# Patient Record
Sex: FEMALE | Race: AMERICAN INDIAN OR ALASKA NATIVE
[De-identification: names, ages, dates, MRNs, and addresses within clinical notes are randomized per-mention and may not be internally consistent; named-entity substitution may affect disease eponyms.]

---

## 2021-01-21 ENCOUNTER — HOSPITAL ENCOUNTER (EMERGENCY)
Dept: HOSPITAL 5 - ED | Age: 21
Discharge: HOME | End: 2021-01-21
Payer: MEDICAID

## 2021-01-21 VITALS — SYSTOLIC BLOOD PRESSURE: 111 MMHG | DIASTOLIC BLOOD PRESSURE: 60 MMHG
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
## 2021-01-21 DIAGNOSIS — K64.9: ICD-10-CM

## 2021-01-21 DIAGNOSIS — Z79.899: ICD-10-CM

## 2021-01-21 DIAGNOSIS — S39.012A: Primary | ICD-10-CM
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
## 2021-01-21 LAB
BACTERIA #/AREA URNS HPF: (no result) /HPF
BASOPHILS # (AUTO): 0 K/MM3 (ref 0–0.1)
BASOPHILS NFR BLD AUTO: 0.4 % (ref 0–1.8)
BILIRUB UR QL STRIP: (no result)
BLOOD UR QL VISUAL: (no result)
BUN SERPL-MCNC: 13 MG/DL (ref 7–17)
BUN/CREAT SERPL: 19 %
CALCIUM SERPL-MCNC: 9.2 MG/DL (ref 8.4–10.2)
EOSINOPHIL # BLD AUTO: 0.2 K/MM3 (ref 0–0.4)
EOSINOPHIL NFR BLD AUTO: 3.9 % (ref 0–4.3)
HCT VFR BLD CALC: 42 % (ref 30.3–42.9)
HEMOLYSIS INDEX: 12
HGB BLD-MCNC: 13.9 GM/DL (ref 10.1–14.3)
LYMPHOCYTES # BLD AUTO: 1.9 K/MM3 (ref 1.2–5.4)
LYMPHOCYTES NFR BLD AUTO: 39.4 % (ref 13.4–35)
MCHC RBC AUTO-ENTMCNC: 33 % (ref 30–34)
MCV RBC AUTO: 89 FL (ref 79–97)
MONOCYTES # (AUTO): 0.4 K/MM3 (ref 0–0.8)
MONOCYTES % (AUTO): 7.4 % (ref 0–7.3)
MUCOUS THREADS #/AREA URNS HPF: (no result) /HPF
PH UR STRIP: 6 [PH] (ref 5–7)
PLATELET # BLD: 264 K/MM3 (ref 140–440)
PROT UR STRIP-MCNC: (no result) MG/DL
RBC # BLD AUTO: 4.75 M/MM3 (ref 3.65–5.03)
RBC #/AREA URNS HPF: 1 /HPF (ref 0–6)
UROBILINOGEN UR-MCNC: 4 MG/DL (ref ?–2)
WBC #/AREA URNS HPF: 2 /HPF (ref 0–6)

## 2021-01-21 PROCEDURE — 85025 COMPLETE CBC W/AUTO DIFF WBC: CPT

## 2021-01-21 PROCEDURE — 81025 URINE PREGNANCY TEST: CPT

## 2021-01-21 PROCEDURE — 81001 URINALYSIS AUTO W/SCOPE: CPT

## 2021-01-21 PROCEDURE — 36415 COLL VENOUS BLD VENIPUNCTURE: CPT

## 2021-01-21 PROCEDURE — 80048 BASIC METABOLIC PNL TOTAL CA: CPT

## 2021-01-21 NOTE — EMERGENCY DEPARTMENT REPORT
ED General Adult HPI





- General


Chief complaint: Urogenital-Female


Stated complaint: LOW BACK PAIN (POSS PREG)


PUI?: No


Time Seen by Provider: 01/21/21 20:52


Source: patient


Mode of arrival: Ambulatory


Limitations: No Limitations





- History of Present Illness


Initial comments: 





Ms. Oconnor is a 20-year-old female with no prior medical history who presents 

the ED complaining of mid lower back pain for the past couple of weeks.  Patient

denies any injury or trauma to the back.  Patient states that back pain is 

localized to the mid lower back.  Patient denies any radiation elsewhere.  

Patient also states that she thinks she may be pregnant she has not had a 

menstrual period in 2 years so she is unsure.  Patient states that she does have

a Nexplanon in her left arm that was placed 2 years ago.  Patient also states 

that she has a history of hemorrhoids that causes her discomfort from time to 

time.  She denies fever/chills/nausea vomiting/vaginal bleed/pelvic pain/dysuria

or any other symptoms





- Related Data


                                  Previous Rx's











 Medication  Instructions  Recorded  Last Taken  Type


 


Docusate Sodium [Colace] 100 mg PO BID PRN #30 capsule 01/21/21 Unknown Rx


 


Hydrocortisone [Anucort-HC SUPPOS] 25 mg RC BID #30 supp.rect 01/21/21 Unknown 

Rx











                                    Allergies











Allergy/AdvReac Type Severity Reaction Status Date / Time


 


No Known Allergies Allergy   Unverified 01/21/21 19:16














ED Review of Systems


ROS: 


Stated complaint: LOW BACK PAIN (POSS PREG)


Other details as noted in HPI





Comment: All other systems reviewed and negative





ED Past Medical Hx





- Past Medical History


Previous Medical History?: No


Additional medical history: hemorrhoids





- Surgical History


Past Surgical History?: No





- Medications


Home Medications: 


                                Home Medications











 Medication  Instructions  Recorded  Confirmed  Last Taken  Type


 


Docusate Sodium [Colace] 100 mg PO BID PRN #30 capsule 01/21/21  Unknown Rx


 


Hydrocortisone [Anucort-HC SUPPOS] 25 mg RC BID #30 supp.rect 01/21/21  Unknown 

Rx














ED Physical Exam





- General


Limitations: No Limitations


General appearance: alert, in no apparent distress





- Head


Head exam: Present: atraumatic, normocephalic





- Eye


Eye exam: Present: normal appearance





- ENT


ENT exam: Present: mucous membranes moist





- Neck


Neck exam: Present: normal inspection





- Respiratory


Respiratory exam: Present: normal lung sounds bilaterally.  Absent: respiratory 

distress





- Cardiovascular


Cardiovascular Exam: Present: regular rate, normal rhythm.  Absent: systolic 

murmur, diastolic murmur, rubs, gallop





- GI/Abdominal


GI/Abdominal exam: Present: soft, normal bowel sounds.  Absent: distended, 

tenderness





- Extremities Exam


Extremities exam: Present: normal inspection, full ROM.  Absent: tenderness





- Back Exam


Back exam: Present: normal inspection, full ROM.  Absent: tenderness, CVA te

nderness (R), CVA tenderness (L), muscle spasm, paraspinal tenderness





- Neurological Exam


Neurological exam: Present: alert, oriented X3, normal gait





- Psychiatric


Psychiatric exam: Present: normal affect, normal mood





- Skin


Skin exam: Present: warm, dry, intact, normal color.  Absent: rash





ED Course


                                   Vital Signs











  01/21/21 01/21/21





  19:17 23:42


 


Temperature 99.6 F 


 


Pulse Rate 68 72


 


Respiratory 16 18





Rate  


 


Blood Pressure 111/60 


 


O2 Sat by Pulse 98 97





Oximetry  














ED Medical Decision Making





- Lab Data


Result diagrams: 


                                 01/21/21 19:54





                                 01/21/21 19:54








                             Laboratory Last Values











WBC  4.9 K/mm3 (4.5-11.0)   01/21/21  19:54    


 


RBC  4.75 M/mm3 (3.65-5.03)   01/21/21  19:54    


 


Hgb  13.9 gm/dl (10.1-14.3)   01/21/21  19:54    


 


Hct  42.0 % (30.3-42.9)   01/21/21  19:54    


 


MCV  89 fl (79-97)   01/21/21  19:54    


 


MCH  29 pg (28-32)   01/21/21  19:54    


 


MCHC  33 % (30-34)   01/21/21  19:54    


 


RDW  13.9 % (13.2-15.2)   01/21/21  19:54    


 


Plt Count  264 K/mm3 (140-440)   01/21/21  19:54    


 


Lymph % (Auto)  39.4 % (13.4-35.0)  H  01/21/21  19:54    


 


Mono % (Auto)  7.4 % (0.0-7.3)  H  01/21/21  19:54    


 


Eos % (Auto)  3.9 % (0.0-4.3)   01/21/21  19:54    


 


Baso % (Auto)  0.4 % (0.0-1.8)   01/21/21  19:54    


 


Lymph # (Auto)  1.9 K/mm3 (1.2-5.4)   01/21/21  19:54    


 


Mono # (Auto)  0.4 K/mm3 (0.0-0.8)   01/21/21  19:54    


 


Eos # (Auto)  0.2 K/mm3 (0.0-0.4)   01/21/21  19:54    


 


Baso # (Auto)  0.0 K/mm3 (0.0-0.1)   01/21/21  19:54    


 


Seg Neutrophils %  48.9 % (40.0-70.0)   01/21/21  19:54    


 


Seg Neutrophils #  2.4 K/mm3 (1.8-7.7)   01/21/21  19:54    


 


Sodium  138 mmol/L (137-145)   01/21/21  19:54    


 


Potassium  4.1 mmol/L (3.6-5.0)   01/21/21  19:54    


 


Chloride  102.4 mmol/L ()   01/21/21  19:54    


 


Carbon Dioxide  29 mmol/L (22-30)   01/21/21  19:54    


 


Anion Gap  11 mmol/L  01/21/21  19:54    


 


BUN  13 mg/dL (7-17)   01/21/21  19:54    


 


Creatinine  0.7 mg/dL (0.6-1.2)   01/21/21  19:54    


 


Estimated GFR  > 60 ml/min  01/21/21  19:54    


 


BUN/Creatinine Ratio  19 %  01/21/21  19:54    


 


Glucose  94 mg/dL ()   01/21/21  19:54    


 


Calcium  9.2 mg/dL (8.4-10.2)   01/21/21  19:54    


 


Urine Color  Yellow  (Yellow)   01/21/21  Unknown


 


Urine Turbidity  Slightly-cloudy  (Clear)   01/21/21  Unknown


 


Urine pH  6.0  (5.0-7.0)   01/21/21  Unknown


 


Ur Specific Gravity  1.027  (1.003-1.030)   01/21/21  Unknown


 


Urine Protein  <15 mg/dl mg/dL (Negative)   01/21/21  Unknown


 


Urine Glucose (UA)  Neg mg/dL (Negative)   01/21/21  Unknown


 


Urine Ketones  Neg mg/dL (Negative)   01/21/21  Unknown


 


Urine Blood  Neg  (Negative)   01/21/21  Unknown


 


Urine Nitrite  Neg  (Negative)   01/21/21  Unknown


 


Urine Bilirubin  Neg  (Negative)   01/21/21  Unknown


 


Urine Urobilinogen  4.0 mg/dL (<2.0)   01/21/21  Unknown


 


Ur Leukocyte Esterase  Neg  (Negative)   01/21/21  Unknown


 


Urine WBC (Auto)  2.0 /HPF (0.0-6.0)   01/21/21  Unknown


 


Urine RBC (Auto)  1.0 /HPF (0.0-6.0)   01/21/21  Unknown


 


U Epithel Cells (Auto)  1.0 /HPF (0-13.0)   01/21/21  Unknown


 


Urine Bacteria (Auto)  2+ /HPF (Negative)   01/21/21  Unknown


 


Urine Mucus  3+ /HPF  01/21/21  Unknown


 


Urine HCG, Qual  Negative  (Negative)   01/21/21  Unknown














- Medical Decision Making


20-year-old female presents with hemorrhoids/low back muscle strain


All labs are within normal limits no abnormalities seen.  Urinalysis negative 

urine pregnancy test negative


I discussed all findings with the patient.  I discussed with patient need to 

follow-up with her primary care physician.


Patient understand instructions and states she will follow-up.  Patient had no 

acute or respiratory distress throughout ED stay.


Discussed with patient follow-up with gastroenterology for management of 

worsening hemorrhoids.





Critical care attestation.: 


If time is entered above; I have spent that time in minutes in the direct care 

of this critically ill patient, excluding procedure time.








ED Disposition


Clinical Impression: 


 Hemorrhoids, Strain of muscle, fascia and tendon of lower back, initial 

encounter





Disposition: DC-01 TO HOME OR SELFCARE


Is pt being admited?: No


Does the pt Need Aspirin: No


Condition: Stable


Instructions:  Surgical Procedures for Hemorrhoids, Nonsurgical Procedures for 

Hemorrhoids, Care After, Lumbar Strain


Additional Instructions: 


Make sure to follow up with the primary care physician as discussed.


Take all your medications as you've been prescribed.


If you have any worsening symptoms or develop new symptoms please return to ED 

immediately.


Prescriptions: 


Hydrocortisone [Anucort-HC SUPPOS] 25 mg RC BID #30 supp.rect


Docusate Sodium [Colace] 100 mg PO BID PRN #30 capsule


 PRN Reason: Constipation


Referrals: 


[facility] [Provider Group] - 3-5 Days


[facility] [Outside] - 3-5 Days


[facility] [Outside] - 3-5 Days


Forms:  Accompanied Note, Work/School Release Form(ED)


Time of Disposition: 21:53

## 2021-06-28 ENCOUNTER — HOSPITAL ENCOUNTER (EMERGENCY)
Dept: HOSPITAL 5 - ED | Age: 21
Discharge: HOME | End: 2021-06-28
Payer: MEDICAID

## 2021-06-28 VITALS — SYSTOLIC BLOOD PRESSURE: 120 MMHG | DIASTOLIC BLOOD PRESSURE: 74 MMHG

## 2021-06-28 DIAGNOSIS — Z79.899: ICD-10-CM

## 2021-06-28 DIAGNOSIS — N39.0: Primary | ICD-10-CM

## 2021-06-28 LAB
BACTERIA #/AREA URNS HPF: (no result) /HPF
BILIRUB UR QL STRIP: (no result)
BLOOD UR QL VISUAL: (no result)
MUCOUS THREADS #/AREA URNS HPF: (no result) /HPF
PH UR STRIP: 5 [PH] (ref 5–7)
RBC #/AREA URNS HPF: 61 /HPF (ref 0–6)
UROBILINOGEN UR-MCNC: < 2 MG/DL (ref ?–2)
WBC #/AREA URNS HPF: > 182 /HPF (ref 0–6)

## 2021-06-28 PROCEDURE — 81025 URINE PREGNANCY TEST: CPT

## 2021-06-28 PROCEDURE — 81001 URINALYSIS AUTO W/SCOPE: CPT

## 2021-06-28 NOTE — EMERGENCY DEPARTMENT REPORT
ED Female  HPI





- General


Chief complaint: Urogenital-Female


Stated complaint: KIDNEY PAINS BACK PAINS


Time Seen by Provider: 06/28/21 10:33


Source: patient


Mode of arrival: Ambulatory


Limitations: No Limitations





- History of Present Illness


Initial comments: 





Patient is a 20-year-old female presents emergency room with complaints of a 

possible UTI.  She states that she has had symptoms for a week and reports she 

has been drinking cranberry juice as she thought that would help her symptoms.  

She states that she has lower back discomfort, lower abdominal discomfort, 

urinary frequency, urinary urgency, pressure at the end of urination.  She 

denies any fever, nausea, vomiting, diarrhea, abnormal vaginal discharge.  She 

denies any concerns for STDs.  No past medical history.  No allergies to 

medications.  She states that she has an Implanon for birth control





- Related Data


                                  Previous Rx's











 Medication  Instructions  Recorded  Last Taken  Type


 


Docusate Sodium [Colace] 100 mg PO BID PRN #30 capsule 01/21/21 Unknown Rx


 


Hydrocortisone [Anucort-HC SUPPOS] 25 mg RC BID #30 supp.rect 01/21/21 Unknown 

Rx


 


cephALEXin [Keflex] 500 mg PO BID 7 Days #14 cap 06/28/21 Unknown Rx











                                    Allergies











Allergy/AdvReac Type Severity Reaction Status Date / Time


 


No Known Allergies Allergy   Verified 06/28/21 10:04














ED Review of Systems


ROS: 


Stated complaint: KIDNEY PAINS BACK PAINS


Other details as noted in HPI





Comment: All other systems reviewed and negative





ED Past Medical Hx





- Past Medical History


Additional medical history: hemorrhoids





- Social History


Smoking Status: Never Smoker


Substance Use Type: None





- Medications


Home Medications: 


                                Home Medications











 Medication  Instructions  Recorded  Confirmed  Last Taken  Type


 


Docusate Sodium [Colace] 100 mg PO BID PRN #30 capsule 01/21/21  Unknown Rx


 


Hydrocortisone [Anucort-HC SUPPOS] 25 mg RC BID #30 supp.rect 01/21/21  Unknown 

Rx


 


cephALEXin [Keflex] 500 mg PO BID 7 Days #14 cap 06/28/21  Unknown Rx














ED Physical Exam





- General


Limitations: No Limitations


General appearance: alert, in no apparent distress





- Head


Head exam: Present: atraumatic, normocephalic





- Eye


Eye exam: Present: normal appearance





- ENT


ENT exam: Present: mucous membranes moist





- Respiratory


Respiratory exam: Present: normal lung sounds bilaterally.  Absent: respiratory 

distress, wheezes, rales, rhonchi, stridor, chest wall tenderness, accessory 

muscle use, decreased breath sounds, prolonged expiratory





- Cardiovascular


Cardiovascular Exam: Present: regular rate, normal rhythm, normal heart sounds. 

 Absent: systolic murmur, diastolic murmur, rubs, gallop





- GI/Abdominal


GI/Abdominal exam: Present: soft, normal bowel sounds.  Absent: distended, 

tenderness, guarding, rebound, rigid





- Back Exam


Back exam: Absent: CVA tenderness (R), CVA tenderness (L)





- Neurological Exam


Neurological exam: Present: alert, oriented X3





- Psychiatric


Psychiatric exam: Present: normal affect, normal mood





- Skin


Skin exam: Present: warm, dry, intact





ED Course


                                   Vital Signs











  06/28/21 06/28/21





  09:57 11:52


 


Temperature 99.5 F 


 


Pulse Rate 85 78


 


Respiratory 16 20





Rate  


 


Blood Pressure 107/50 


 


Blood Pressure  120/74





[Left]  


 


O2 Sat by Pulse 100 100





Oximetry  














ED Medical Decision Making





- Lab Data








                                   Lab Results











  06/28/21 Range/Units





  Unknown 


 


Urine Color  Yellow  (Yellow)  


 


Urine Turbidity  Turbid  (Clear)  


 


Urine pH  5.0  (5.0-7.0)  


 


Ur Specific Gravity  1.017  (1.003-1.030)  


 


Urine Protein  100 mg/dl  (Negative)  mg/dL


 


Urine Glucose (UA)  Neg  (Negative)  mg/dL


 


Urine Ketones  Neg  (Negative)  mg/dL


 


Urine Blood  Mod  (Negative)  


 


Urine Nitrite  Neg  (Negative)  


 


Urine Bilirubin  Neg  (Negative)  


 


Urine Urobilinogen  < 2.0  (<2.0)  mg/dL


 


Ur Leukocyte Esterase  Lg  (Negative)  


 


Urine WBC (Auto)  > 182.0 H  (0.0-6.0)  /HPF


 


Urine RBC (Auto)  61.0  (0.0-6.0)  /HPF


 


U Epithel Cells (Auto)  2.0  (0-13.0)  /HPF


 


Urine Bacteria (Auto)  2+  (Negative)  /HPF


 


Urine Mucus  1+  /HPF


 


Urine HCG, Qual  Negative  (Negative)  














- Medical Decision Making





Patient is a 20-year-old female presents emergency room with complaints of a 

possible UTI.  She states that she has had symptoms for a week and reports she 

has been drinking cranberry juice as she thought that would help her symptoms.  

She states that she has lower back discomfort, lower abdominal discomfort, 

urinary frequency, urinary urgency, pressure at the end of urination.  She 

denies any fever, nausea, vomiting, diarrhea, abnormal vaginal discharge.  She 

denies any concerns for STDs.  No past medical history.  No allergies to 

medications.  She states that she has an Implanon for birth control.  Vitals are

 normal.  No abdominal tenderness or CVA tenderness on exam.  UA shows evidence 

of UTI.  Patient given prescription for Keflex.  Advised patient Please take 

medication as prescribed to completion.  Increase your fluid intake.  Follow-up 

with a primary care doctor and have your urine retested for clearance of 

bacteria.  Return to emergency room for any new or symptoms.


Critical care attestation.: 


If time is entered above; I have spent that time in minutes in the direct care 

of this critically ill patient, excluding procedure time.








ED Disposition


Clinical Impression: 


UTI (urinary tract infection)


Qualifiers:


 Urinary tract infection type: acute cystitis Hematuria presence: with hematuria

 Qualified Code(s): N30.01 - Acute cystitis with hematuria





Disposition: DC-01 TO HOME OR SELFCARE


Is pt being admited?: No


Does the pt Need Aspirin: No


Condition: Stable


Instructions:  Urinary Tract Infection, Adult, Easy-to-Read


Additional Instructions: 


Please take medication as prescribed to completion.  Increase your fluid intake.

  Follow-up with a primary care doctor and have your urine retested for 

clearance of bacteria.  Return to emergency room for any new or symptoms.


Prescriptions: 


cephALEXin [Keflex] 500 mg PO BID 7 Days #14 cap


Referrals: 


[facility] [Provider Group] - 3-5 Days


JESSE SPENCE MD [Staff Physician] - 3-5 Days


PRIMARY CARE,MD [Primary Care Provider] - 3-5 Days


Time of Disposition: 11:40


Print Language: ENGLISH

## 2021-11-25 ENCOUNTER — HOSPITAL ENCOUNTER (EMERGENCY)
Dept: HOSPITAL 5 - ED | Age: 21
Discharge: HOME | End: 2021-11-25
Payer: MEDICAID

## 2021-11-25 VITALS — DIASTOLIC BLOOD PRESSURE: 84 MMHG | SYSTOLIC BLOOD PRESSURE: 126 MMHG

## 2021-11-25 DIAGNOSIS — N94.6: Primary | ICD-10-CM

## 2021-11-25 DIAGNOSIS — R79.1: ICD-10-CM

## 2021-11-25 LAB
ALBUMIN SERPL-MCNC: 4.3 G/DL (ref 3.9–5)
ALT SERPL-CCNC: 12 UNITS/L (ref 7–56)
APTT BLD: 30.5 SEC. (ref 24.2–36.6)
BASOPHILS # (AUTO): 0 K/MM3 (ref 0–0.1)
BILIRUB UR QL STRIP: (no result)
BLOOD UR QL VISUAL: (no result)
BUN SERPL-MCNC: 10 MG/DL (ref 7–17)
BUN/CREAT SERPL: 17 %
CALCIUM SERPL-MCNC: 9 MG/DL (ref 8.4–10.2)
EOSINOPHIL # BLD AUTO: 0.1 K/MM3 (ref 0–0.4)
EOSINOPHIL NFR BLD AUTO: 3 % (ref 0–4.3)
HCT VFR BLD CALC: 42.8 % (ref 30.3–42.9)
HEMOLYSIS INDEX: 4
HGB BLD-MCNC: 14.2 GM/DL (ref 10.1–14.3)
INR PPP: 0.96 (ref 0.87–1.13)
LYMPHOCYTES # BLD AUTO: 1.2 K/MM3 (ref 1.2–5.4)
LYMPHOCYTES NFR BLD AUTO: 26.5 % (ref 13.4–35)
MCHC RBC AUTO-ENTMCNC: 33 % (ref 30–34)
MCV RBC AUTO: 89 FL (ref 79–97)
MONOCYTES # (AUTO): 0.3 K/MM3 (ref 0–0.8)
MONOCYTES % (AUTO): 7.2 % (ref 0–7.3)
MUCOUS THREADS #/AREA URNS HPF: (no result) /HPF
PH UR STRIP: 6 [PH] (ref 5–7)
PLATELET # BLD: 273 K/MM3 (ref 140–440)
PROT UR STRIP-MCNC: (no result) MG/DL
RBC # BLD AUTO: 4.83 M/MM3 (ref 3.65–5.03)
RBC #/AREA URNS HPF: 2 /HPF (ref 0–6)
UROBILINOGEN UR-MCNC: 2 MG/DL (ref ?–2)
WBC #/AREA URNS HPF: 4 /HPF (ref 0–6)

## 2021-11-25 PROCEDURE — 93975 VASCULAR STUDY: CPT

## 2021-11-25 PROCEDURE — 76830 TRANSVAGINAL US NON-OB: CPT

## 2021-11-25 PROCEDURE — 85610 PROTHROMBIN TIME: CPT

## 2021-11-25 PROCEDURE — 99284 EMERGENCY DEPT VISIT MOD MDM: CPT

## 2021-11-25 PROCEDURE — 36415 COLL VENOUS BLD VENIPUNCTURE: CPT

## 2021-11-25 PROCEDURE — 85730 THROMBOPLASTIN TIME PARTIAL: CPT

## 2021-11-25 PROCEDURE — 80053 COMPREHEN METABOLIC PANEL: CPT

## 2021-11-25 PROCEDURE — 84703 CHORIONIC GONADOTROPIN ASSAY: CPT

## 2021-11-25 PROCEDURE — 85025 COMPLETE CBC W/AUTO DIFF WBC: CPT

## 2021-11-25 PROCEDURE — 81001 URINALYSIS AUTO W/SCOPE: CPT

## 2021-11-25 NOTE — ULTRASOUND REPORT
Pelvic ultrasound with Doppler



INDICATION: Pelvic pain with bleeding of the vagina



TECHNIQUE: Real-time grayscale and Doppler imaging of the pelvis performed.



FINDINGS: The uterus measures 8 x 3 x 4 cm. Endometrial thickness is about 2 mm. Both ovaries measure
 about 2.5 cm in diameter and contain multiple follicles. There is normal Doppler flow involving both
 ovaries. Minimal free pelvic fluid



IMPRESSION: Multiple small follicles within both ovaries. Normal Doppler flow. Minimal free pelvic fl
uid, probably physiologic.



Signer Name: Aurelio Napoles MD 

Signed: 11/25/2021 10:09 AM

Workstation Name: [id number]

## 2021-11-25 NOTE — ULTRASOUND REPORT
Pelvic ultrasound with Doppler



INDICATION: Pelvic pain with bleeding of the vagina



TECHNIQUE: Real-time grayscale and Doppler imaging of the pelvis performed.



FINDINGS: The uterus measures 8 x 3 x 4 cm. Endometrial thickness is about 2 mm. Both ovaries measure
 about 2.5 cm in diameter and contain multiple follicles. There is normal Doppler flow involving both
 ovaries. Minimal free pelvic fluid



IMPRESSION: Multiple small follicles within both ovaries. Normal Doppler flow. Minimal free pelvic fl
uid, probably physiologic.



Signer Name: Aurelio Napoles MD 

Signed: 11/25/2021 10:09 AM

Workstation Name: [id number]

## 2021-11-25 NOTE — EMERGENCY DEPARTMENT REPORT
ED Female  HPI





- General


Chief complaint: Vaginal Bleeding


Stated complaint: VAGINAL BLEEDING


Time Seen by Provider: 21 08:31


Source: patient


Mode of arrival: Ambulatory


Limitations: No Limitations





- History of Present Illness


Initial comments: 





This is a 21-year-old female nontoxic, well nourished in appearance, no acute 

signs of distress presents to the ED with c/o of vaginal bleeding and pelvic 

cramping x 2 weeks. Denies any upper abdomen pains.  Patient denies any vaginal 

discharge or foul odor. Patient denies any nausea, vomiting, chest pain, 

shortness of breathe, fever, chills, headache, stiff neck, numbness, tingling. 

Patient denies any urinary symptoms. Patient denies any allergies or PMH.


MD Complaint: vaginal bleeding, pelvic pain


-: days(s)


Radiation: non-radiating


Severity: mild


Severity scale (0 -10): 3


Quality: cramping


Consistency: intermittent


Improves with: none


Worsens with: none


Associated Symptoms: vaginal bleeding.  denies: vaginal discharge, abdominal 

pain, nausea/vomiting, fever/chills, headaches, loss of appetite, dysuria, 

hematuria, rash, seizure, shortness of breath, syncope, weakness





- Related Data


                                  Previous Rx's











 Medication  Instructions  Recorded  Last Taken  Type


 


Docusate Sodium [Colace] 100 mg PO BID PRN #30 capsule 21 Unknown Rx


 


Hydrocortisone [Anucort-HC SUPPOS] 25 mg RC BID #30 supp.rect 21 Unknown 

Rx


 


cephALEXin [Keflex] 500 mg PO BID 7 Days #14 cap 21 Unknown Rx


 


Naproxen 500 mg PO Q12H PRN #12 tablet 21 Unknown Rx











                                    Allergies











Allergy/AdvReac Type Severity Reaction Status Date / Time


 


No Known Allergies Allergy   Verified 21 08:30














ED Review of Systems


ROS: 


Stated complaint: VAGINAL BLEEDING


Other details as noted in HPI





Comment: All other systems reviewed and negative


Constitutional: denies: chills, fever


Eyes: denies: eye pain, eye discharge, vision change


ENT: denies: ear pain, throat pain


Respiratory: denies: cough, shortness of breath, wheezing


Cardiovascular: denies: chest pain, palpitations


Endocrine: no symptoms reported


Gastrointestinal: denies: abdominal pain, nausea, diarrhea


Genitourinary: abnormal menses.  denies: urgency, dysuria, frequency, hematuria,

 discharge, dyspareunia


Musculoskeletal: denies: back pain, joint swelling, arthralgia


Skin: denies: rash, lesions


Neurological: denies: headache, weakness, paresthesias


Psychiatric: denies: anxiety, depression


Hematological/Lymphatic: denies: easy bleeding, easy bruising





ED Past Medical Hx





- Past Medical History


Additional medical history: hemorrhoids





- Social History


Smoking Status: Never Smoker


Substance Use Type: None





- Medications


Home Medications: 


                                Home Medications











 Medication  Instructions  Recorded  Confirmed  Last Taken  Type


 


Docusate Sodium [Colace] 100 mg PO BID PRN #30 capsule 21 Unknown

 Rx


 


Hydrocortisone [Anucort-HC SUPPOS] 25 mg RC BID #30 supp.rect 21 

Unknown Rx


 


cephALEXin [Keflex] 500 mg PO BID 7 Days #14 cap 21 Unknown Rx


 


Naproxen 500 mg PO Q12H PRN #12 tablet 21  Unknown Rx














ED Physical Exam





- General


Limitations: No Limitations


General appearance: alert, in no apparent distress





- Head


Head exam: Present: atraumatic, normocephalic





- Eye


Eye exam: Present: normal appearance





- Neck


Neck exam: Present: normal inspection, full ROM.  Absent: lymphadenopathy





- Respiratory


Respiratory exam: Absent: respiratory distress





- Cardiovascular


Cardiovascular Exam: Present: regular rate





- GI/Abdominal


GI/Abdominal exam: Present: soft, normal bowel sounds.  Absent: distended, te

nderness, guarding, rebound, rigid, diminished bowel sounds





- Extremities Exam


Extremities exam: Present: full ROM





- Back Exam


Back exam: Present: normal inspection, full ROM.  Absent: tenderness, CVA 

tenderness (R), CVA tenderness (L), muscle spasm, paraspinal tenderness, 

vertebral tenderness, rash noted





- Neurological Exam


Neurological exam: Present: alert, oriented X3, normal gait





- Psychiatric


Psychiatric exam: Present: normal affect, normal mood





- Skin


Skin exam: Present: warm, dry, intact, normal color.  Absent: rash





ED Course


                                   Vital Signs











  21





  08:41 08:45


 


Temperature 98.2 F 


 


Pulse Rate 76 


 


Respiratory 18 





Rate  


 


Blood Pressure 126/84 





[Left]  


 


O2 Sat by Pulse 100 100





Oximetry  














- Reevaluation(s)


Reevaluation #1: 





21 09:26


Patient is speaking in full sentences with no signs of distress noted.





ED Medical Decision Making





- Lab Data


Result diagrams: 


                                 21 09:44





                                 21 09:44








                                   Lab Results











  21 Range/Units





  09:37 09:44 09:44 


 


WBC   4.6   (4.5-11.0)  K/mm3


 


RBC   4.83   (3.65-5.03)  M/mm3


 


Hgb   14.2   (10.1-14.3)  gm/dl


 


Hct   42.8   (30.3-42.9)  %


 


MCV   89   (79-97)  fl


 


MCH   29   (28-32)  pg


 


MCHC   33   (30-34)  %


 


RDW   13.6   (13.2-15.2)  %


 


Plt Count   273   (140-440)  K/mm3


 


Lymph % (Auto)   26.5   (13.4-35.0)  %


 


Mono % (Auto)   7.2   (0.0-7.3)  %


 


Eos % (Auto)   3.0   (0.0-4.3)  %


 


Baso % (Auto)   Np   


 


Lymph # (Auto)   1.2   (1.2-5.4)  K/mm3


 


Mono # (Auto)   0.3   (0.0-0.8)  K/mm3


 


Eos # (Auto)   0.1   (0.0-0.4)  K/mm3


 


Baso # (Auto)   0.0   (0.0-0.1)  K/mm3


 


Seg Neutrophils %   62.9   (40.0-70.0)  %


 


Seg Neutrophils #   2.9   (1.8-7.7)  K/mm3


 


PT     (12.2-14.9)  Sec.


 


INR     (0.87-1.13)  


 


APTT     (24.2-36.6)  Sec.


 


Sodium    136 L  (137-145)  mmol/L


 


Potassium    3.6  (3.6-5.0)  mmol/L


 


Chloride    99.6  ()  mmol/L


 


Carbon Dioxide    24  (22-30)  mmol/L


 


Anion Gap    16  mmol/L


 


BUN    10  (7-17)  mg/dL


 


Creatinine    0.6  (0.6-1.2)  mg/dL


 


Estimated GFR    > 60  ml/min


 


BUN/Creatinine Ratio    17  %


 


Glucose    95  ()  mg/dL


 


Calcium    9.0  (8.4-10.2)  mg/dL


 


Total Bilirubin    0.50  (0.1-1.2)  mg/dL


 


AST    17  (5-40)  units/L


 


ALT    12  (7-56)  units/L


 


Alkaline Phosphatase    57  ()  units/L


 


Total Protein    8.6 H  (6.3-8.2)  g/dL


 


Albumin    4.3  (3.9-5)  g/dL


 


Albumin/Globulin Ratio    1.0  %


 


HCG, Qual  Negative    (Negative)  


 


Urine Color     (Yellow)  


 


Urine Turbidity     (Clear)  


 


Urine pH     (5.0-7.0)  


 


Ur Specific Gravity     (1.003-1.030)  


 


Urine Protein     (Negative)  mg/dL


 


Urine Glucose (UA)     (Negative)  mg/dL


 


Urine Ketones     (Negative)  mg/dL


 


Urine Blood     (Negative)  


 


Urine Nitrite     (Negative)  


 


Urine Bilirubin     (Negative)  


 


Urine Urobilinogen     (<2.0)  mg/dL


 


Ur Leukocyte Esterase     (Negative)  


 


Urine WBC (Auto)     (0.0-6.0)  /HPF


 


Urine RBC (Auto)     (0.0-6.0)  /HPF


 


U Epithel Cells (Auto)     (0-13.0)  /HPF


 


Urine Mucus     /HPF














  21 Range/Units





  09:44 Unknown 


 


WBC    (4.5-11.0)  K/mm3


 


RBC    (3.65-5.03)  M/mm3


 


Hgb    (10.1-14.3)  gm/dl


 


Hct    (30.3-42.9)  %


 


MCV    (79-97)  fl


 


MCH    (28-32)  pg


 


MCHC    (30-34)  %


 


RDW    (13.2-15.2)  %


 


Plt Count    (140-440)  K/mm3


 


Lymph % (Auto)    (13.4-35.0)  %


 


Mono % (Auto)    (0.0-7.3)  %


 


Eos % (Auto)    (0.0-4.3)  %


 


Baso % (Auto)    


 


Lymph # (Auto)    (1.2-5.4)  K/mm3


 


Mono # (Auto)    (0.0-0.8)  K/mm3


 


Eos # (Auto)    (0.0-0.4)  K/mm3


 


Baso # (Auto)    (0.0-0.1)  K/mm3


 


Seg Neutrophils %    (40.0-70.0)  %


 


Seg Neutrophils #    (1.8-7.7)  K/mm3


 


PT  13.9   (12.2-14.9)  Sec.


 


INR  0.96   (0.87-1.13)  


 


APTT  30.5   (24.2-36.6)  Sec.


 


Sodium    (137-145)  mmol/L


 


Potassium    (3.6-5.0)  mmol/L


 


Chloride    ()  mmol/L


 


Carbon Dioxide    (22-30)  mmol/L


 


Anion Gap    mmol/L


 


BUN    (7-17)  mg/dL


 


Creatinine    (0.6-1.2)  mg/dL


 


Estimated GFR    ml/min


 


BUN/Creatinine Ratio    %


 


Glucose    ()  mg/dL


 


Calcium    (8.4-10.2)  mg/dL


 


Total Bilirubin    (0.1-1.2)  mg/dL


 


AST    (5-40)  units/L


 


ALT    (7-56)  units/L


 


Alkaline Phosphatase    ()  units/L


 


Total Protein    (6.3-8.2)  g/dL


 


Albumin    (3.9-5)  g/dL


 


Albumin/Globulin Ratio    %


 


HCG, Qual    (Negative)  


 


Urine Color   Yellow  (Yellow)  


 


Urine Turbidity   Clear  (Clear)  


 


Urine pH   6.0  (5.0-7.0)  


 


Ur Specific Gravity   1.020  (1.003-1.030)  


 


Urine Protein   <15 mg/dl  (Negative)  mg/dL


 


Urine Glucose (UA)   Neg  (Negative)  mg/dL


 


Urine Ketones   Neg  (Negative)  mg/dL


 


Urine Blood   Mod  (Negative)  


 


Urine Nitrite   Neg  (Negative)  


 


Urine Bilirubin   Neg  (Negative)  


 


Urine Urobilinogen   2.0  (<2.0)  mg/dL


 


Ur Leukocyte Esterase   Neg  (Negative)  


 


Urine WBC (Auto)   4.0  (0.0-6.0)  /HPF


 


Urine RBC (Auto)   2.0  (0.0-6.0)  /HPF


 


U Epithel Cells (Auto)   5.0  (0-13.0)  /HPF


 


Urine Mucus   1+  /HPF














- Radiology Data





[facility] [address] [place] [address] 

Ultrasound Report Signed Patient: ESTHER LIM MR#: [id number] : 

2000 Acct:[id number] Age/Sex: 21 / F ADM Date: 21 Loc: ED 

Attending Dr: Ordering Physician: PRECIOUS ROLAND NP Date of Service: 21 

Procedure(s): US pelvis duplex doppler comp Accession Number(s): [id number] cc: 

PRECIOUS ROLAND NP Pelvic ultrasound with Doppler INDICATION: Pelvic pain with 

bleeding of the vagina TECHNIQUE: Real-time grayscale and Doppler imaging of the

 pelvis performed. FINDINGS: The uterus measures 8 x 3 x 4 cm. Endometrial 

thickness is about 2 mm. Both ovaries measure about 2.5 cm in diameter and 

contain multiple follicles. There is normal Doppler flow involving both ovaries.

 Minimal free pelvic fluid IMPRESSION: Multiple small follicles within both 

ovaries. Normal Doppler flow. Minimal free pelvic fluid, probably physiologic. 

Signer Name: Aurelio Napoles MD Signed: 2021 10:09 AM Workstation Name: 

[id number] Transcribed By: BC Dictated By: Aurelio Napoles MD Electronically 

Authenticated By: Aurelio Napoles MD Signed Date/Time: 21 1009 DD/DT: 

21 1008 TD/TT:





[address] [address] 

Ultrasound Report Signed Patient: ESTHER LIM MR#: [id number] : 

2000 Acct:[id number] Age/Sex: 21 / F ADM Date: 21 Loc: ED At

[facility] Dr: Ordering Physician: PRECIOUS ROLAND NP Date of Service: 21 

Procedure(s): US transvaginal Accession Number(s): [id number] cc: PRECIOUS ROLAND NP

 Pelvic ultrasound with Doppler INDICATION: Pelvic pain with bleeding of the 

vagina TECHNIQUE: Real-time grayscale and Doppler imaging of the pelvis 

performed. FINDINGS: The uterus measures 8 x 3 x 4 cm. Endometrial thickness is 

about 2 mm. Both ovaries measure about 2.5 cm in diameter and contain multiple 

follicles. There is normal Doppler flow involving both ovaries. Minimal free 

pelvic fluid IMPRESSION: Multiple small follicles within both ovaries. Normal 

Doppler flow. Minimal free pelvic fluid, probably physiologic. Signer Name: 

Aurelio Napoles MD Signed: 2021 10:09 AM Workstation Name: [id number] 

Transcribed By: BC Dictated By: Aurelio Napoles MD Electronically Authenticated

 By: Aurelio Napoles MD Signed Date/Time: 21 1009 DD/DT: 21 1008 

TD/TT:





- Medical Decision Making





This is a 21-year-old female presents with dysmenorrhea.  Patient is stable and 

was examined by me.  Normal abdominal exam. US obtained and dictated by the 

radiologist. Ua obtained.  Patient notified of the US report with no questions 

noted by the patient.  Patient was instructed f/u with OB/GYN in 3-5 days. Labs 

within normal limits.  Patient stated after given Tylenol symptoms of pelvic 

cramping has resolved.  At time of discharge, the patient does not seem toxic or

 ill in appearance.  No acute signs of distress noted.  Patient agrees to 

discharge treatment plan of care.  No further questions noted by the patient.


Critical care attestation.: 


If time is entered above; I have spent that time in minutes in the direct care 

of this critically ill patient, excluding procedure time.








ED Disposition


Clinical Impression: 


 Dysmenorrhea, Abnormal menstrual cycle





Disposition:  HOME / SELF CARE / HOMELESS


Is pt being admited?: No


Does the pt Need Aspirin: No


Condition: Stable


Instructions:  Dysmenorrhea, Easy-to-Read


Additional Instructions: 


Follow-up with a OB/GYN doctor in 3-5 days or if symptoms worsen and continue 

return to emergency room as soon as possible. 


Prescriptions: 


Naproxen 500 mg PO Q12H PRN #12 tablet


 PRN Reason: Pain , Severe (7-10)


Referrals: 


[facility] [Primary Care Provider] - 3-5 Days


[facility] [Provider Group] - 3-5 Days


[facility] [Provider Group] - 3-5 Days


Forms:  Work/School Release Form(ED)


Time of Disposition: 10:42